# Patient Record
Sex: MALE | Race: WHITE | URBAN - METROPOLITAN AREA
[De-identification: names, ages, dates, MRNs, and addresses within clinical notes are randomized per-mention and may not be internally consistent; named-entity substitution may affect disease eponyms.]

---

## 2017-12-13 ENCOUNTER — TRANSFERRED RECORDS (OUTPATIENT)
Dept: HEALTH INFORMATION MANAGEMENT | Facility: CLINIC | Age: 27
End: 2017-12-13

## 2017-12-13 ENCOUNTER — HOSPITAL ENCOUNTER (INPATIENT)
Facility: CLINIC | Age: 27
LOS: 1 days | Discharge: HOME | DRG: 811 | End: 2017-12-15
Attending: INTERNAL MEDICINE | Admitting: INTERNAL MEDICINE
Payer: COMMERCIAL

## 2017-12-13 NOTE — IP AVS SNAPSHOT
[place] [address] [facility]    [address]    [facility] [address]    Phone:  [phone]    Fax:  [phone]                                       After Visit Summary   12/13/2017    Rick Youssef    MRN: [id number]           After Visit Summary Signature Page     I have received my discharge instructions, and my questions have been answered. I have discussed any challenges I see with this plan with the nurse or doctor.    ..........................................................................................................................................  Patient/Patient Representative Signature      ..........................................................................................................................................  Patient Representative Print Name and Relationship to Patient    ..................................................               ................................................  Date                                            Time    ..........................................................................................................................................  Reviewed by Signature/Title    ...................................................              ..............................................  Date                                                            Time

## 2017-12-14 PROBLEM — K92.2 LOWER GI BLEED: Status: ACTIVE | Noted: 2017-12-14

## 2017-12-14 LAB
ABO + RH BLD: NORMAL
ABO + RH BLD: NORMAL
ANION GAP SERPL CALCULATED.3IONS-SCNC: 11 MMOL/L (ref 3–14)
BLD GP AB SCN SERPL QL: NORMAL
BLOOD BANK CMNT PATIENT-IMP: NORMAL
BUN SERPL-MCNC: 11 MG/DL (ref 7–30)
CALCIUM SERPL-MCNC: 7.3 MG/DL (ref 8.5–10.1)
CHLORIDE SERPL-SCNC: 108 MMOL/L (ref 94–109)
CO2 SERPL-SCNC: 21 MMOL/L (ref 20–32)
COLONOSCOPY: NORMAL
CREAT SERPL-MCNC: 0.72 MG/DL (ref 0.66–1.25)
GFR SERPL CREATININE-BSD FRML MDRD: >90 ML/MIN/1.7M2
GLUCOSE SERPL-MCNC: 111 MG/DL (ref 70–99)
HGB BLD-MCNC: 10.2 G/DL (ref 13.3–17.7)
HGB BLD-MCNC: 8.8 G/DL (ref 13.3–17.7)
HGB BLD-MCNC: 9.3 G/DL (ref 13.3–17.7)
MAGNESIUM SERPL-MCNC: 1.5 MG/DL (ref 1.6–2.3)
POTASSIUM SERPL-SCNC: 4 MMOL/L (ref 3.4–5.3)
SODIUM SERPL-SCNC: 140 MMOL/L (ref 133–144)
SPECIMEN EXP DATE BLD: NORMAL

## 2017-12-14 PROCEDURE — 45382 COLONOSCOPY W/CONTROL BLEED: CPT | Performed by: INTERNAL MEDICINE

## 2017-12-14 PROCEDURE — 12000007 ZZH R&B INTERMEDIATE

## 2017-12-14 PROCEDURE — G0500 MOD SEDAT ENDO SERVICE >5YRS: HCPCS | Performed by: INTERNAL MEDICINE

## 2017-12-14 PROCEDURE — 83735 ASSAY OF MAGNESIUM: CPT | Performed by: INTERNAL MEDICINE

## 2017-12-14 PROCEDURE — 99207 ZZC APP CREDIT; MD BILLING SHARED VISIT: CPT | Performed by: INTERNAL MEDICINE

## 2017-12-14 PROCEDURE — 36415 COLL VENOUS BLD VENIPUNCTURE: CPT | Performed by: INTERNAL MEDICINE

## 2017-12-14 PROCEDURE — 99222 1ST HOSP IP/OBS MODERATE 55: CPT | Mod: AI | Performed by: INTERNAL MEDICINE

## 2017-12-14 PROCEDURE — 80048 BASIC METABOLIC PNL TOTAL CA: CPT | Performed by: INTERNAL MEDICINE

## 2017-12-14 PROCEDURE — 25000128 H RX IP 250 OP 636: Performed by: INTERNAL MEDICINE

## 2017-12-14 PROCEDURE — 85018 HEMOGLOBIN: CPT | Performed by: INTERNAL MEDICINE

## 2017-12-14 PROCEDURE — 86900 BLOOD TYPING SEROLOGIC ABO: CPT | Performed by: INTERNAL MEDICINE

## 2017-12-14 PROCEDURE — 86850 RBC ANTIBODY SCREEN: CPT | Performed by: INTERNAL MEDICINE

## 2017-12-14 PROCEDURE — 25000132 ZZH RX MED GY IP 250 OP 250 PS 637: Performed by: INTERNAL MEDICINE

## 2017-12-14 PROCEDURE — 25000132 ZZH RX MED GY IP 250 OP 250 PS 637: Performed by: PHYSICIAN ASSISTANT

## 2017-12-14 PROCEDURE — 86901 BLOOD TYPING SEROLOGIC RH(D): CPT | Performed by: INTERNAL MEDICINE

## 2017-12-14 PROCEDURE — 99153 MOD SED SAME PHYS/QHP EA: CPT | Performed by: INTERNAL MEDICINE

## 2017-12-14 PROCEDURE — 0W3P8ZZ CONTROL BLEEDING IN GASTROINTESTINAL TRACT, VIA NATURAL OR ARTIFICIAL OPENING ENDOSCOPIC: ICD-10-PCS | Performed by: INTERNAL MEDICINE

## 2017-12-14 PROCEDURE — 27210585 ZZH DEVICE CLIP QUICK: Performed by: INTERNAL MEDICINE

## 2017-12-14 PROCEDURE — 45381 COLONOSCOPY SUBMUCOUS NJX: CPT | Performed by: INTERNAL MEDICINE

## 2017-12-14 RX ORDER — HYDROMORPHONE HYDROCHLORIDE 1 MG/ML
.3-.5 INJECTION, SOLUTION INTRAMUSCULAR; INTRAVENOUS; SUBCUTANEOUS
Status: DISCONTINUED | OUTPATIENT
Start: 2017-12-14 | End: 2017-12-15 | Stop reason: HOSPADM

## 2017-12-14 RX ORDER — ACETAMINOPHEN 325 MG/1
650 TABLET ORAL EVERY 4 HOURS PRN
Status: DISCONTINUED | OUTPATIENT
Start: 2017-12-14 | End: 2017-12-15 | Stop reason: HOSPADM

## 2017-12-14 RX ORDER — POTASSIUM CHLORIDE 1500 MG/1
20-40 TABLET, EXTENDED RELEASE ORAL
Status: DISCONTINUED | OUTPATIENT
Start: 2017-12-14 | End: 2017-12-15 | Stop reason: HOSPADM

## 2017-12-14 RX ORDER — TEMAZEPAM 15 MG/1
15 CAPSULE ORAL
Status: DISCONTINUED | OUTPATIENT
Start: 2017-12-14 | End: 2017-12-15 | Stop reason: HOSPADM

## 2017-12-14 RX ORDER — MAGNESIUM SULFATE HEPTAHYDRATE 40 MG/ML
4 INJECTION, SOLUTION INTRAVENOUS EVERY 4 HOURS PRN
Status: DISCONTINUED | OUTPATIENT
Start: 2017-12-14 | End: 2017-12-15 | Stop reason: HOSPADM

## 2017-12-14 RX ORDER — FENTANYL CITRATE 50 UG/ML
INJECTION, SOLUTION INTRAMUSCULAR; INTRAVENOUS PRN
Status: DISCONTINUED | OUTPATIENT
Start: 2017-12-14 | End: 2017-12-14 | Stop reason: HOSPADM

## 2017-12-14 RX ORDER — PROCHLORPERAZINE MALEATE 5 MG
10 TABLET ORAL EVERY 6 HOURS PRN
Status: DISCONTINUED | OUTPATIENT
Start: 2017-12-14 | End: 2017-12-15 | Stop reason: HOSPADM

## 2017-12-14 RX ORDER — PROCHLORPERAZINE 25 MG
25 SUPPOSITORY, RECTAL RECTAL EVERY 12 HOURS PRN
Status: DISCONTINUED | OUTPATIENT
Start: 2017-12-14 | End: 2017-12-15 | Stop reason: HOSPADM

## 2017-12-14 RX ORDER — ONDANSETRON 4 MG/1
4 TABLET, ORALLY DISINTEGRATING ORAL EVERY 6 HOURS PRN
Status: DISCONTINUED | OUTPATIENT
Start: 2017-12-14 | End: 2017-12-15 | Stop reason: HOSPADM

## 2017-12-14 RX ORDER — POTASSIUM CHLORIDE 7.45 MG/ML
10 INJECTION INTRAVENOUS
Status: DISCONTINUED | OUTPATIENT
Start: 2017-12-14 | End: 2017-12-15 | Stop reason: HOSPADM

## 2017-12-14 RX ORDER — SODIUM CHLORIDE AND POTASSIUM CHLORIDE 150; 900 MG/100ML; MG/100ML
INJECTION, SOLUTION INTRAVENOUS CONTINUOUS
Status: DISCONTINUED | OUTPATIENT
Start: 2017-12-14 | End: 2017-12-15 | Stop reason: HOSPADM

## 2017-12-14 RX ORDER — PANTOPRAZOLE SODIUM 40 MG/1
40 TABLET, DELAYED RELEASE ORAL DAILY
Status: DISCONTINUED | OUTPATIENT
Start: 2017-12-14 | End: 2017-12-15 | Stop reason: HOSPADM

## 2017-12-14 RX ORDER — POTASSIUM CHLORIDE 1.5 G/1.58G
20-40 POWDER, FOR SOLUTION ORAL
Status: DISCONTINUED | OUTPATIENT
Start: 2017-12-14 | End: 2017-12-15 | Stop reason: HOSPADM

## 2017-12-14 RX ORDER — FLUMAZENIL 0.1 MG/ML
0.2 INJECTION, SOLUTION INTRAVENOUS
Status: ACTIVE | OUTPATIENT
Start: 2017-12-14 | End: 2017-12-15

## 2017-12-14 RX ORDER — POTASSIUM CL/LIDO/0.9 % NACL 10MEQ/0.1L
10 INTRAVENOUS SOLUTION, PIGGYBACK (ML) INTRAVENOUS
Status: DISCONTINUED | OUTPATIENT
Start: 2017-12-14 | End: 2017-12-15 | Stop reason: HOSPADM

## 2017-12-14 RX ORDER — NALOXONE HYDROCHLORIDE 0.4 MG/ML
.1-.4 INJECTION, SOLUTION INTRAMUSCULAR; INTRAVENOUS; SUBCUTANEOUS
Status: DISCONTINUED | OUTPATIENT
Start: 2017-12-14 | End: 2017-12-15 | Stop reason: HOSPADM

## 2017-12-14 RX ORDER — MAGNESIUM CARB/ALUMINUM HYDROX 105-160MG
296 TABLET,CHEWABLE ORAL ONCE
Status: COMPLETED | OUTPATIENT
Start: 2017-12-14 | End: 2017-12-14

## 2017-12-14 RX ORDER — ONDANSETRON 2 MG/ML
4 INJECTION INTRAMUSCULAR; INTRAVENOUS EVERY 6 HOURS PRN
Status: DISCONTINUED | OUTPATIENT
Start: 2017-12-14 | End: 2017-12-15 | Stop reason: HOSPADM

## 2017-12-14 RX ORDER — LIDOCAINE 40 MG/G
CREAM TOPICAL
Status: DISCONTINUED | OUTPATIENT
Start: 2017-12-14 | End: 2017-12-15 | Stop reason: HOSPADM

## 2017-12-14 RX ORDER — POTASSIUM CHLORIDE 29.8 MG/ML
20 INJECTION INTRAVENOUS
Status: DISCONTINUED | OUTPATIENT
Start: 2017-12-14 | End: 2017-12-15 | Stop reason: HOSPADM

## 2017-12-14 RX ORDER — OXYCODONE HYDROCHLORIDE 5 MG/1
5-10 TABLET ORAL
Status: DISCONTINUED | OUTPATIENT
Start: 2017-12-14 | End: 2017-12-15 | Stop reason: HOSPADM

## 2017-12-14 RX ADMIN — MAGNESIUM SULFATE IN WATER 4 G: 40 INJECTION, SOLUTION INTRAVENOUS at 10:50

## 2017-12-14 RX ADMIN — PANTOPRAZOLE SODIUM 40 MG: 40 TABLET, DELAYED RELEASE ORAL at 01:37

## 2017-12-14 RX ADMIN — MAGNESIUM CITRATE 300 ML: 1.75 LIQUID ORAL at 10:44

## 2017-12-14 RX ADMIN — POTASSIUM CHLORIDE AND SODIUM CHLORIDE: 900; 150 INJECTION, SOLUTION INTRAVENOUS at 01:37

## 2017-12-14 RX ADMIN — POTASSIUM CHLORIDE AND SODIUM CHLORIDE: 900; 150 INJECTION, SOLUTION INTRAVENOUS at 21:56

## 2017-12-14 RX ADMIN — POLYETHYLENE GLYCOL-3350 AND ELECTROLYTES 4000 ML: 236; 6.74; 5.86; 2.97; 22.74 POWDER, FOR SOLUTION ORAL at 01:37

## 2017-12-14 RX ADMIN — POTASSIUM CHLORIDE AND SODIUM CHLORIDE: 900; 150 INJECTION, SOLUTION INTRAVENOUS at 10:50

## 2017-12-14 RX ADMIN — POTASSIUM CHLORIDE 20 MEQ: 1500 TABLET, EXTENDED RELEASE ORAL at 09:41

## 2017-12-14 RX ADMIN — PANTOPRAZOLE SODIUM 40 MG: 40 TABLET, DELAYED RELEASE ORAL at 08:18

## 2017-12-14 ASSESSMENT — ACTIVITIES OF DAILY LIVING (ADL)
ADLS_ACUITY_SCORE: 9
FALL_HISTORY_WITHIN_LAST_SIX_MONTHS: NO
RETIRED_EATING: 0-->INDEPENDENT
BATHING: 0-->INDEPENDENT
ADLS_ACUITY_SCORE: 9
RETIRED_COMMUNICATION: 0-->UNDERSTANDS/COMMUNICATES WITHOUT DIFFICULTY
AMBULATION: 0-->INDEPENDENT
ADLS_ACUITY_SCORE: 9
DRESS: 0-->INDEPENDENT
TRANSFERRING: 0-->INDEPENDENT
COGNITION: 0 - NO COGNITION ISSUES REPORTED
ADLS_ACUITY_SCORE: 9
SWALLOWING: 0-->SWALLOWS FOODS/LIQUIDS WITHOUT DIFFICULTY
ADLS_ACUITY_SCORE: 9
TOILETING: 0-->INDEPENDENT

## 2017-12-14 NOTE — PROGRESS NOTES
Patient seen and examined.  Chart reviewed.  Please see admission H&P by Dr. Tabares from earlier today for details.

## 2017-12-14 NOTE — H&P
[facility]  History and Physical   Hospitalist Service    Corbin Tabares MD    Rick Youssef MRN# [id number]   YOB: 1990 Age: 27 year old      Date of Admission:  12/13/2017           Assessment and Plan:   Rick Youssef is a 27-year-old male without chronic medical problems.  He presented to the [facility] on 12/13/17 for evaluation of grossly bloody stools.  He had 2 episodes of a small amount of blood per rectum 2 to 3 weeks ago.  Earlier in the day on 12/13/17he began to have bloody stools again.  He had a total of 6 stools.  He went to the [facility] for evaluation.  Vital signs showed tachycardia.  Labs showed mild elevations of ALT and AST.  Hemoglobin was 13.7.  CT scan of abdomen and pelvis was obtained.  This showed considerable intraluminal hyperdensity in the transverse colon.  Etiology was uncertain but it was thought that it might represent intraluminal contrast extravasation. No discrete mass was noted.  It was thought that this could represent sequela of a bleeding lesion. Evaluation with colonoscopy was recommended.  I was asked to admit Rick directly from the [facility]. While at the [facility], while discussing direct admission to the hospital, Rick had an episode of lightheadedness with associated hypotension and diaphoresis.  Rhythm was sinus.  Symptoms resolved spontaneously.    Problem list:    1.  Lower GI bleed.  Etiology is uncertain.  Consider arteriovenous malformation, polyp, or mass.  Patient will be admitted as inpatient.  Serial hemoglobins will be checked. He will be typed and screened.  I will order LocketLY colon prep tonight.  I will consult gastroenterology for colonoscopy.  Rick will be n.p.o. With normal saline IV fluid provided.  Protonix twice daily will be ordered.    2.  Possible vasovagal episode at the urgency room.  Monitor on telemetry.  Monitor hemoglobin.    3.  Mild elevations of ALT and AST.  Repeat liver function tests  tomorrow morning.    Full code  Ambulate for DVT prophylaxis  Disposition: Admit as inpatient.           Code Status:   Full Code         Primary Care Physician:   No primary care provider on file. None         Chief Complaint:   Red blood per rectum    History is obtained from Dr. Monica Cabrera, and the medical record.          History of Present Illness:   Rick Youssef is a 27-year-old male without chronic medical problems.  He presented to the [facility] on 12/13/17 for evaluation of grossly bloody stools.  He had 2 episodes of a small amount of blood per rectum 2 to 3 weeks ago.  Earlier in the day on 12/13/17he began to have bloody stools again.  He had a total of 6 stools.  He went to the [facility] for evaluation.  Vital signs showed tachycardia.  Labs showed mild elevations of ALT and AST.  Hemoglobin was 13.7.  CT scan of abdomen and pelvis was obtained.  This showed considerable intraluminal hyperdensity in the transverse colon.  Etiology was uncertain but it was thought that it might represent intraluminal contrast extravasation. No discrete mass was noted.  It was thought that this could represent sequela of a bleeding lesion. Evaluation with colonoscopy was recommended.  I was asked to admit Rick directly from the [facility]. While at the [facility], while discussing direct admission to the hospital, Rick had an episode of lightheadedness with associated hypotension and diaphoresis.  Rhythm was sinus.  Symptoms resolved spontaneously.           Past Medical History:     Patient Active Problem List   Diagnosis     Lower GI bleed      No chronic medical problems        Past Surgical History:   No previous surgeries.         Home Medications:     No medications         Allergies:   No known allergies to medications         Social History:       Non smoker  Drinks only occasionally          Family History:   No history of bleeding disorder or GI malignancy        "    Review of Systems:   The 10 point Review of Systems is negative other than as noted in the HPI.           Physical Exam:   Blood pressure 125/70, temperature 97.1  F (36.2  C), temperature source Oral, resp. rate 16, height 1.854 m (6' 1\"), weight 97.5 kg (214 lb 14.4 oz), SpO2 98 %.  214 lbs 14.4 oz      GENERAL: Pleasant and cooperative. No acute distress.  EYES: Pupils equal and round. No scleral erythema or icterus.  ENT: External ears are normal without deformity. Posterior oropharynx is without erythem, swelling, or exudate.  NECK: Supple. No masses or swelling. No tenderness. Thyroid is normal without mass or tenderness.  CHEST: Clear to auscultation. Normal breath sounds. No retractions.   CV: Regular rate and rhythm. No JVD. Pulses normal.  ABDOMEN: Bowel sounds present. No tenderness. No masses or hernia.  EXTREMETIES: No clubbing, cyanosis, or ischemia.  SKIN: Warm and dry to touch. No wounds or rashes.  NEUROLOGIC: Strength and sensation are normal. Deep tendon reflexes are normal. Cranial nerves are normal.             Data:   All new lab and imaging data was reviewed.     All laboratory and imaging data have been reviewed by myself    Hemoglobin 13.7, white blood cells 6.4,  Platelets 181, sodium 145, Potassium 4.2, chloride 109, bicarbonate 21, BUN 19, creatinine 1, anion gap 15, glucose 121, total bilirubin 0.3, alkaline phosphatase 84, , and AST 85.     See highlights of CT of abdomen and pelvis above.  "

## 2017-12-14 NOTE — CONSULTS
GASTROENTEROLOGY CONSULTATION      Rick Youssef  [address] [facility] [address]  27 year old male     Admission Date/Time: 12/13/2017  Primary Care Provider: No primary care provider on file.  Referring / Attending Physician:  Dr. Tabares     We were asked to see the patient in consultation by Dr. Tabares for evaluation of rectal bleeding        HPI:  Rick Youssef is a 27 year old male without significant past medical history who presented to the hospital with hematochezia.    Patient is a good historian.  He reports that last week he noticed red blood mixed with stool.  Initially he ignored his symptoms.  However a few days later he began noticing blood stool every time he had a bowel movement.  Yesterday he estimates passing 6 bloody bowel movements.  He went to the emergency room for evaluation.  His hemoglobin was 13.6.  CT scan of his abdomen and pelvis showed a intraluminal hyperdensity in the transverse colon.  No mass was seen.  It is unclear if this was related to a bleeding lesion.  Patient denies any history of rectal bleeding.  He has no abdominal pain.  No nausea or vomiting.  No fevers chills.  He has been maintaining a stable weight.  He has no medical issues prior to 1 week ago.  He does drink alcohol on a daily basis but denies dependency.  No NSAID use.  He denies any rectal trauma.    He has no history of surgery on his abdomen.  No family history of colon polyps, colon cancer, or inflammatory bowel disease.  He and his wife have a new 2-week-old infant at home.       PAST MEDICAL HISTORY:  Patient Active Problem List    Diagnosis Date Noted     Lower GI bleed 12/14/2017     Priority: Medium          ROS: A comprehensive ten point review of systems was negative aside from those in mentioned in the HPI.       MEDICATIONS:   Prior to Admission medications    Not on File        ALLERGIES: Not on File     SOCIAL HISTORY:  Social History   Substance Use Topics     Smoking status:  "Not on file     Smokeless tobacco: Not on file     Alcohol use Not on file        FAMILY HISTORY: No family history of colon polyps, colon cancer or inflammatory bowel disease.       PHYSICAL EXAM:     /74 (BP Location: Right arm)  Temp 98.4  F (36.9  C) (Oral)  Resp 18  Ht 1.854 m (6' 1\")  Wt 97.5 kg (214 lb 14.4 oz)  SpO2 97%  BMI 28.35 kg/m2     PHYSICAL EXAM:  GENERAL: No distress  SKIN: no suspicious lesions, rashes, jaundice  HEAD: Normocephalic. Atraumatic.  NECK: Neck supple. No adenopathy.   EYES: No scleral icterus  RESPIRATORY: Good transmission. CTA bilaterally.   CARDIOVASCULAR: RRR, normal S1, S2,  No murmur appreciated  GASTROINTESTINAL: +BS, soft, non tender, non distended, no guarding/rebound  JOINT/EXTREMITIES:  no gross deformities noted, normal muscle tone  NEURO: CN 2-12 grossly intact, no focal deficits  PSYCH: Normal affect              ADDITIONAL COMMENTS:   I reviewed the patient's new clinical lab test results.   Recent Labs   Lab Test  12/14/17   0828  12/14/17   0126   HGB  9.3*  10.2*     Recent Labs   Lab Test  12/14/17   0828   POTASSIUM  4.0   CHLORIDE  108   CO2  21   BUN  11   ANIONGAP  11        CONSULTATION ASSESSMENT AND PLAN:    Rick Youssef is a 27 year old male without significant past medical history who presents to the hospital with one-week hematochezia and a drifting hemoglobin dropping approximately 4 g since admission.  The patient did undergo a prep with GoLYTELY yesterday evening.    1.  Hematochezia: Ongoing rectal bleeding with dropping hemoglobin and CT evidence of possible abnormality in the transverse colon.  Plan at this point will be to complete a colonoscopy this afternoon.  Patient has been n.p.o.  We will add magnesium citrate prior to colonoscopy this afternoon.  Differential is broad at this point but includes inflammatory bowel disease, colon cancer, infectious etiology.   Transfusions per medicine team.   No stool studies were obtained. Prep " is running clear currently.     2. Acute blood loss anemia: 4 g drop in hemoglobin, ongoing rectal bleeding.  Continue to monitor serial hemoglobin.  Transfusions as needed per medicine.      I discussed the patient plan with Dr. Lancaster Thank you for asking us to participate in the care of this patient.    Rosita Perera PA-C  [facility]    --------------------------------  Colonoscopy done today with likely bleeding dieulefoy lesion.  Please see my report for assessment/plan.    We will continue to follow.    Berenice Lancaster MD  [facility]

## 2017-12-14 NOTE — PLAN OF CARE
VSS. BP teens to 120's. Afebrile. HR in the 80's. Denies pain. Only one BM while on the floor, bright red liquid/loose. Started bowel prep for planned c-scope this AM. NPO other than bowel prep. No other issue noted. Hgb 10.2, will reassess in am. Type and Screen completed.

## 2017-12-14 NOTE — PHARMACY-ADMISSION MEDICATION HISTORY
Admission medication history interview status for this patient is complete. See [facility] admission navigator for allergy information, prior to admission medications and immunization status.     Medication history interview source(s):Patient  Medication history resources (including written lists, pill bottles, clinic record):None  Primary pharmacy: n/a    Changes made to PTA medication list:  Added: none  Deleted: none  Changed: none    Actions taken by pharmacist (provider contacted, etc):None     Additional medication history information:None    Medication reconciliation/reorder completed by provider prior to medication history? No    Do you take OTC medications (eg tylenol, ibuprofen, fish oil, eye/ear drops, etc)? n(Y/N)    For patients on insulin therapy: N (Y/N)      Prior to Admission medications    Not on File

## 2017-12-14 NOTE — PROCEDURES
PRE-PROCEDURE H&P    CHIEF COMPLAINT / REASON FOR PROCEDURE:  hematochezia    PERTINENT HISTORY :    History reviewed. No pertinent past medical history.   History reviewed. No pertinent surgical history.      Bleeding tendencies:  No    Relevant Family History:  NONE     Relevant Social History:  NONE      A relevant review of systems was performed and was negative      ALLERGIES/SENSITIVITIES: Not on File    CURRENT MEDICATIONS:   No current outpatient prescriptions on file.        PRE-SEDATION ASSESSMENT:    Lung Exam:  normal  Heart Exam:  normal  Airway Exam: normal  Previous reaction to anesthesia/sedation:   No  Sedation plan based on assessment: Moderate (conscious) sedation  ASA Classification:  1 - Healthy patient, no medical problems        IMPRESSION:  hematochezia    PLAN:  Colonoscopy    Mae Lancaster  [facility]  Office: [phone]

## 2017-12-14 NOTE — PROGRESS NOTES
Patient's demographic information incomplete. No insurance shown. Contacted Financial Counselor (*[id number]) spoke with Thien. They will follow patient for any insurance needs.     Carol Aburto RN, BSN, CTS  [facility]  [phone]

## 2017-12-15 VITALS
TEMPERATURE: 98.6 F | DIASTOLIC BLOOD PRESSURE: 63 MMHG | OXYGEN SATURATION: 98 % | WEIGHT: 214.9 LBS | SYSTOLIC BLOOD PRESSURE: 119 MMHG | RESPIRATION RATE: 18 BRPM | BODY MASS INDEX: 28.48 KG/M2 | HEIGHT: 73 IN

## 2017-12-15 LAB
ALBUMIN SERPL-MCNC: 2.9 G/DL (ref 3.4–5)
ALP SERPL-CCNC: 52 U/L (ref 40–150)
ALT SERPL W P-5'-P-CCNC: 76 U/L (ref 0–70)
ANION GAP SERPL CALCULATED.3IONS-SCNC: 8 MMOL/L (ref 3–14)
AST SERPL W P-5'-P-CCNC: 68 U/L (ref 0–45)
BILIRUB DIRECT SERPL-MCNC: 0.1 MG/DL (ref 0–0.2)
BILIRUB SERPL-MCNC: 0.5 MG/DL (ref 0.2–1.3)
BUN SERPL-MCNC: 5 MG/DL (ref 7–30)
CALCIUM SERPL-MCNC: 7.7 MG/DL (ref 8.5–10.1)
CHLORIDE SERPL-SCNC: 106 MMOL/L (ref 94–109)
CO2 SERPL-SCNC: 25 MMOL/L (ref 20–32)
CREAT SERPL-MCNC: 0.79 MG/DL (ref 0.66–1.25)
ERYTHROCYTE [DISTWIDTH] IN BLOOD BY AUTOMATED COUNT: 12.5 % (ref 10–15)
GFR SERPL CREATININE-BSD FRML MDRD: >90 ML/MIN/1.7M2
GLUCOSE SERPL-MCNC: 97 MG/DL (ref 70–99)
HCT VFR BLD AUTO: 23.2 % (ref 40–53)
HGB BLD-MCNC: 8 G/DL (ref 13.3–17.7)
MAGNESIUM SERPL-MCNC: 2.4 MG/DL (ref 1.6–2.3)
MCH RBC QN AUTO: 32.9 PG (ref 26.5–33)
MCHC RBC AUTO-ENTMCNC: 34.5 G/DL (ref 31.5–36.5)
MCV RBC AUTO: 96 FL (ref 78–100)
PLATELET # BLD AUTO: 105 10E9/L (ref 150–450)
POTASSIUM SERPL-SCNC: 4.2 MMOL/L (ref 3.4–5.3)
PROT SERPL-MCNC: 5.5 G/DL (ref 6.8–8.8)
RBC # BLD AUTO: 2.43 10E12/L (ref 4.4–5.9)
SODIUM SERPL-SCNC: 139 MMOL/L (ref 133–144)
WBC # BLD AUTO: 4.1 10E9/L (ref 4–11)

## 2017-12-15 PROCEDURE — 25000132 ZZH RX MED GY IP 250 OP 250 PS 637: Performed by: INTERNAL MEDICINE

## 2017-12-15 PROCEDURE — 85027 COMPLETE CBC AUTOMATED: CPT | Performed by: INTERNAL MEDICINE

## 2017-12-15 PROCEDURE — 99238 HOSP IP/OBS DSCHRG MGMT 30/<: CPT | Performed by: INTERNAL MEDICINE

## 2017-12-15 PROCEDURE — 80048 BASIC METABOLIC PNL TOTAL CA: CPT | Performed by: INTERNAL MEDICINE

## 2017-12-15 PROCEDURE — 80076 HEPATIC FUNCTION PANEL: CPT | Performed by: INTERNAL MEDICINE

## 2017-12-15 PROCEDURE — 83735 ASSAY OF MAGNESIUM: CPT | Performed by: INTERNAL MEDICINE

## 2017-12-15 PROCEDURE — 36415 COLL VENOUS BLD VENIPUNCTURE: CPT | Performed by: INTERNAL MEDICINE

## 2017-12-15 RX ADMIN — PANTOPRAZOLE SODIUM 40 MG: 40 TABLET, DELAYED RELEASE ORAL at 07:44

## 2017-12-15 ASSESSMENT — ACTIVITIES OF DAILY LIVING (ADL)
ADLS_ACUITY_SCORE: 9

## 2017-12-15 NOTE — DISCHARGE SUMMARY
"Discharge Summary  Hospitalist Service    Rick Youssef MRN# [id number]   YOB: 1990 Age: 27 year old     Date of Admission:  12/13/2017  Date of Discharge:  12/15/2017  Admitting Physician:  Corbin Tabares MD  Discharge Physician: Jovany Roberts DO  Discharging Service: Hospitalist Service     Primary Provider: No Ref-Primary, Physician  Primary Care Physician Phone Number: None         Discharge Diagnoses/Problem Oriented Hospital Course (Providers):    Rick Youssef was admitted on 12/13/2017 by Corbin Tabares MD and I would refer you to their history and physical.  The following problems were addressed during his hospitalization:  1.  Acute blood loss anemia.  Due to GI bleed.  Bleeding appears to have stopped.  Return to hospital if bleeding redevelops.  2.  GI bleed.  GI consulted.  Colonoscopy preformed.  Suspected source is a dieulefoy lesion that was clipped.  Needs to follow up with GI in 3 months for repeat colonoscopy.         Code Status:      Full Code          Pending Results:        Unresulted Labs Ordered in the Past 30 Days of this Admission     No orders found from 10/14/2017 to 12/14/2017.            Discharge Instructions and Follow-Up:      Follow-up Appointments     Follow-up and recommended labs and tests        Follow up with Dr. Lancaster of GI within 3 months for hospital follow- up.   The following labs/tests are recommended: Colonoscopy.                      Discharge Disposition:      Discharged to home         Discharge Medications:      There are no discharge medications for this patient.        Allergies:       Not on File        Condition and Physical on Discharge:      Discharge condition: Stable   Vitals: Blood pressure 117/65, temperature 98  F (36.7  C), temperature source Oral, resp. rate 18, height 1.854 m (6' 1\"), weight 97.5 kg (214 lb 14.4 oz), SpO2 99 %.   Gen:  NAD, A&Ox3.  Eyes:  PERRL, sclera anicteric.  OP:  MMM, no lesions.  Neck:  Supple.  CV:  " Regular, no murmurs.  Lung:  CTA b/l, normal effort.  Ab:  +BS, soft.  Skin:  Warm, dry to touch.  No rash.  Ext:  No pitting edema LE b/l.        Discharge Time:      I spent 25 minutes with Mr. Youssef and working on discharge on 12/15/17.        Image Results From This Hospital Stay (For Non-EPIC Providers):      No results found for this or any previous visit.        Most Recent Lab Results In EPIC (For Non-EPIC Providers):    Most Recent 3 CBC's:  Recent Labs   Lab Test  12/15/17   0752  12/14/17   1450  12/14/17   0828   WBC  4.1   --    --    HGB  8.0*  8.8*  9.3*   MCV  96   --    --    PLT  105*   --    --       Most Recent 3 BMP's:  Recent Labs   Lab Test  12/15/17   0752  12/14/17   0828   NA  139  140   POTASSIUM  4.2  4.0   CHLORIDE  106  108   CO2  25  21   BUN  5*  11   CR  0.79  0.72   ANIONGAP  8  11   ALCON  7.7*  7.3*   GLC  97  111*     Most Recent 3 Troponin's:No lab results found.  Most Recent 3 INR's:No lab results found.  Most Recent 2 LFT's:  Recent Labs   Lab Test  12/15/17   0752   AST  68*   ALT  76*   ALKPHOS  52   BILITOTAL  0.5     Most Recent Cholesterol Panel:No lab results found.  Most Recent 6 Bacteria Isolates From Any Culture (See EPIC Reports for Culture Details):No lab results found.  Most Recent TSH, T4 and HgbA1c: No lab results found.

## 2017-12-15 NOTE — PROGRESS NOTES
Patient has the number for [facility] he will call to make a FUP appointment if he doesn't hear from them in a week.      No other needs for DC.  Chelsy Burciaga RN BSN   Float Pool RN  RN Care Coordinator  [facility]   [phone]

## 2017-12-15 NOTE — PLAN OF CARE
Problem: Gastrointestinal Bleeding (Adult)  Goal: Signs and Symptoms of Listed Potential Problems Will be Absent, Minimized or Managed (Gastrointestinal Bleeding)  Signs and symptoms of listed potential problems will be absent, minimized or managed by discharge/transition of care (reference Gastrointestinal Bleeding (Adult) CPG).   Outcome: Improving  Pt alert and oriented, up with SBA one staff tolerated well.  Denies pain or dizziness.  No signs of bleeding observed.  Last hemoglobin 8.8, repeat at 10;50 pm.  Telemetry SR,  Tolerating clear liquid diet po.  Magnesium replaced on day shift, repeat magnesium level in am.  Potassium level 4.0, replaced on dayshift, repeat potassium level in am.  Pt to discharge to home when condition stable.

## 2017-12-15 NOTE — PROGRESS NOTES
"GASTROENTEROLOGY PROGRESS NOTE        SUBJECTIVE:  No abdominal pain. Passed 3 stools since procedure yesterday. Some pink colored stool, no red blood. Tolerating diet.     OBJECTIVE:    /65 (BP Location: Right arm)  Temp 98  F (36.7  C) (Oral)  Resp 18  Ht 1.854 m (6' 1\")  Wt 97.5 kg (214 lb 14.4 oz)  SpO2 99%  BMI 28.35 kg/m2  Temp (24hrs), Av.8  F (36.6  C), Min:96  F (35.6  C), Max:98.4  F (36.9  C)    Patient Vitals for the past 72 hrs:   Weight   17 2356 97.5 kg (214 lb 14.4 oz)       Intake/Output Summary (Last 24 hours) at 12/15/17 1038  Last data filed at 12/15/17 0632   Gross per 24 hour   Intake              857 ml   Output                0 ml   Net              857 ml        PHYSICAL EXAM     Constitutional: NAD  Cardiovascular: RRR, normal S1, S2, no murmur appreciated   Respiratory: good transmission, CTAB  Abdomen: soft, NTTP       Additional Comments:  ROS, FH, SH: See initial GI consult for details.     I have reviewed the patient's new clinical lab results:     Recent Labs   Lab Test  12/15/17   0752  17   1450  17   0828   WBC  4.1   --    --    HGB  8.0*  8.8*  9.3*   MCV  96   --    --    PLT  105*   --    --      Recent Labs   Lab Test  12/15/17   0752  17   0828   POTASSIUM  4.2  4.0   CHLORIDE  106  108   CO2  25  21   BUN  5*  11   ANIONGAP  8  11     Recent Labs   Lab Test  12/15/17   0752   ALBUMIN  2.9*   BILITOTAL  0.5   ALT  76*   AST  68*        ASSESSMENT/ PLAN  Rick Youssef is a 27 year old male without significant past medical history who presents to the hospital with one-week hematochezia and a drifting hemoglobin dropping approximately 4 g since admission found to have a dieulafoy lesion in the transverse colon.      1.  Hematochezia: Patient was found to have a dieulafoy lesion in the transverse colon.  There was a significant amount of blood seen throughout the colon.  4 clips were placed at the site of the lesion with resolution of " bleeding.  Currently patient is tolerating clear liquids.  He has passed some pink colored stool without brando red blood.  We will advance diet today.  If he does rebleed, interventional radiology will need to be consulted urgently.     --He will require repeat colonoscopy in 3 months as the exam only extended to the transverse colon.  --Monitor serial hemoglobin.       Discussed with Dr. Lancaster.  Rosita Perera PA-C  [facility]

## 2017-12-15 NOTE — PLAN OF CARE
Problem: Patient Care Overview  Goal: Plan of Care/Patient Progress Review  Outcome: Improving  Pt. A&Ox4. VSS, afebrile, denies pain. Transferring independently in room. Continues clear liquid. Tele- SR. Fluids infusing. BS hyperactive, no signs of bleeding. Continue POC.

## 2017-12-15 NOTE — PLAN OF CARE
VSS. Pt states a decrease in stool and blood, last BM was clear of blood. Denies pain or any other issue. Last hgb 8.8, will reassess in the AM, as well as electrolyte levels.

## 2018-09-19 NOTE — IP AVS SNAPSHOT
MRN:[id number]                      After Visit Summary   12/13/2017    Rick Youssef    MRN: [id number]           Thank you!     Thank you for choosing [facility] for your care. Our goal is always to provide you with excellent care. Hearing back from our patients is one way we can continue to improve our services. Please take a few minutes to complete the written survey that you may receive in the mail after you visit. If you would like to speak to someone directly about your visit please contact Patient Relations at [phone]. Thank you!          Patient Information     Date Of Birth          1990        Designated Caregiver       Most Recent Value    Caregiver    Will someone help with your care after discharge? no      About your hospital stay     You were admitted on:  December 13, 2017 You last received care in the:  [place] [address] [facility]    You were discharged on:  December 15, 2017       Who to Call     For medical emergencies, please call 911.  For non-urgent questions about your medical care, please call your primary care provider or clinic, None  For questions related to your surgery, please call your surgery clinic        Attending Provider     Provider Specialty    Corbin Tabares MD Internal Medicine       Primary Care Provider Fax #    Physician No Ref-Primary [phone]      After Care Instructions     Activity       Your activity upon discharge: activity as tolerated            Diet       Follow this diet upon discharge: Regular                  Follow-up Appointments     Follow-up and recommended labs and tests        Follow up with Dr. Lancaster of GI within 3 months for hospital follow- up. The following labs/tests are recommended: Colonoscopy.                  Pending Results     No orders found from 12/11/2017 to 12/14/2017.            Statement of Approval     Ordered          12/15/17 1035  I have reviewed and agree with all the  "recommendations and orders detailed in this document.  EFFECTIVE NOW     Approved and electronically signed by:  Jovany Roberts DO             Admission Information     Date & Time Provider Department Dept. Phone    2017 Corbin Tabares MD [place] [address] [facility] [phone]      Your Vitals Were     Blood Pressure Temperature Respirations Height Weight Pulse Oximetry    119/63 (BP Location: Right arm) 98.6  F (37  C) (Oral) 18 1.854 m (6' 1\") 97.5 kg (214 lb 14.4 oz) 98%    BMI (Body Mass Index)                   28.35 kg/m2           MyChart Information     [facility] [employer] lets you send messages to your doctor, view your test results, renew your prescriptions, schedule appointments and more. To sign up, go to [email or web address] . Click on \"Log in\" on the left side of the screen, which will take you to the Welcome page. Then click on \"Sign up Now\" on the right side of the page.     You will be asked to enter the access code listed below, as well as some personal information. Please follow the directions to create your username and password.     Your access code is: [email or web address]  Expires: 3/15/2018 11:07 AM     Your access code will  in 90 days. If you need help or a new code, please call your [facility] clinic or [phone].        Care EveryWhere ID     This is your Care EveryWhere ID. This could be used by other organizations to access your [facility] medical records  [id number]        Equal Access to Services     [facility] AH: Hadii aad ku hadasho Soomaali, waaxda luqadaha, qaybta kaalmada adeegyada, kinsey huerta . So [facility] [phone].    ATENCIÓN: Si habla español, tiene a beasley disposición servicios gratuitos de asistencia lingüística. Llame al [phone].    We comply with applicable federal civil rights laws and [place] laws. We do not discriminate on the basis of race, color, national origin, age, disability, sex, sexual orientation, or gender " identity.               Review of your medicines      Notice     You have not been prescribed any medications.             Protect others around you: Learn how to safely use, store and throw away your medicines at www.disposemymeds.org.             Medication List: This is a list of all your medications and when to take them. Check marks below indicate your daily home schedule. Keep this list as a reference.      Notice     You have not been prescribed any medications.              More Information        When You Have Gastrointestinal (GI) Bleeding    Blood in your vomit or stool can be a sign of gastrointestinal (GI) bleeding. GI bleeding can be scary. But the cause may not be serious. You should always see a doctor if GI bleeding occurs.  The GI tract  The GI tract is the path through which food travels in the body. Food passes from the mouth down the esophagus (the tube from the mouth to the stomach). Food begins to break down in the stomach. It then moves through the duodenum, the first part of the small intestine. Nutrients are absorbed as food travels through the small intestine. What is left passes into the colon (large intestine) as waste. The colon removes water from the waste. Waste continues from the colon to the rectum (where stool is stored). Waste then leaves the body through the anus.  Causes of GI bleeding  GI bleeding can be caused by many different problems. Some of the more common causes include:    Swollen veins in the anus (hemorrhoids)    Swollen veins in the esophagus (varices)    Sore on the lining of the GI tract (ulcer)    Cuts or scrapes in the mouth or throat    Infection caused by germs such as bacteria or parasites    Food allergies, such as milk allergy in young children    Medicines    Inflammation of the GI tract (gastritis or esophagitis)    Colitis (Crohn's disease or ulcerative colitis)    Cancer (tumors or polyps)    Abnormal pouches in the colon (diverticula)    Tears in the  esophagus or anus    Nosebleed    Abnormal blood vessels in the GI tract (angiodysplasia)  Diagnosing the cause of blood in stool  If blood is coming out in your stool, you may have a lower GI tract problem or a very fast upper GI tract bleed. Bleeding from the GI tract can be bright red. Or it may look dark and tarry. Tests may also find blood in your stool that can t be seen with the eye (occult blood). To find out the cause, tests that may be ordered include:    Blood tests. A blood sample is taken and sent to a lab for exam.    Hemoccult test. Checks a stool sample for blood.    Stool culture. Checks a stool sample for bacteria or parasites.    X-ray, ultrasound, or CT scan. Imaging tests that take pictures of the digestive tract.    Colonoscopy or sigmoidoscopy. This test uses a flexible tube with a tiny camera. The tube is inserted through your anus into your rectum to see the inside of your colon. Your provider can also take a tiny tissue sample (biopsy) and treat a bleeding source  Diagnosing the cause of blood in vomit  If you are vomiting blood or something that looks like coffee grounds, you may have an upper GI tract problem. To find the cause, tests that may be done include:    Upper Endoscopy. A flexible tube with a tiny camera is inserted through your mouth and throat to see inside your upper GI tract. This lets your provider take a tiny tissue sample (biopsy) and treat a bleeding source.    Nasogastric lavage. This can tell if you have upper GI or lower GI bleeding.    X-ray, ultrasound, or CT scan. Imaging tests that take pictures of your digestive tract.    Upper GI series. X-rays of the upper part of your GI tract taken from inside your body.    Enteroscopy. This sends a flexible tube or a small, swallowed capsule camera into your small intestine.  When to call your healthcare provider  Call your healthcare provider right away if you have any of the following:    Bleeding from your mouth or anus  that can't be stopped    Fever of 100.4 F (38.0 ) or higher    Bleeding along with feeling lightheaded or dizzy    Signs of fluid loss (dehydration). These include a dry, sticky mouth, decreased urine output; and very dark urine.    Belly (abdominal) pain   Date Last Reviewed: 7/1/2016 2000-2017 [employer]. [address] [place] [address]. All rights reserved. This information is not intended as a substitute for professional medical care. Always follow your healthcare professional's instructions.              [facility]

## (undated) DEVICE — CLIP HEMOCLIP ENDOSCOPIC INSTINCT 2.8X230CM INSC-7-230-SS

## (undated) DEVICE — KIT ENDO TURNOVER/PROCEDURE W/CLEAN A SCOPE LINERS 103888

## (undated) RX ORDER — FENTANYL CITRATE 50 UG/ML
INJECTION, SOLUTION INTRAMUSCULAR; INTRAVENOUS
Status: DISPENSED
Start: 2017-12-14